# Patient Record
Sex: MALE | Race: WHITE | URBAN - METROPOLITAN AREA
[De-identification: names, ages, dates, MRNs, and addresses within clinical notes are randomized per-mention and may not be internally consistent; named-entity substitution may affect disease eponyms.]

---

## 2017-03-31 RX ORDER — ATORVASTATIN CALCIUM 10 MG/1
TABLET, FILM COATED ORAL
Qty: 90 TABLET | Refills: 0 | Status: SHIPPED | OUTPATIENT
Start: 2017-03-31 | End: 2017-06-22 | Stop reason: SDUPTHER

## 2017-06-22 ENCOUNTER — OFFICE VISIT (OUTPATIENT)
Dept: INTERNAL MEDICINE CLINIC | Age: 57
End: 2017-06-22

## 2017-06-22 VITALS
SYSTOLIC BLOOD PRESSURE: 138 MMHG | BODY MASS INDEX: 30.34 KG/M2 | WEIGHT: 224 LBS | HEART RATE: 88 BPM | DIASTOLIC BLOOD PRESSURE: 74 MMHG | HEIGHT: 72 IN

## 2017-06-22 DIAGNOSIS — M19.90 ARTHRITIS: ICD-10-CM

## 2017-06-22 DIAGNOSIS — M19.90 ARTHRITIS: Primary | ICD-10-CM

## 2017-06-22 LAB
RHEUMATOID FACTOR: <10 IU/ML
SEDIMENTATION RATE, ERYTHROCYTE: 3 MM/HR (ref 0–20)

## 2017-06-22 PROCEDURE — 99213 OFFICE O/P EST LOW 20 MIN: CPT | Performed by: INTERNAL MEDICINE

## 2017-06-22 RX ORDER — PREDNISONE 10 MG/1
TABLET ORAL
Qty: 21 TABLET | Refills: 0 | Status: SHIPPED | OUTPATIENT
Start: 2017-06-22 | End: 2017-07-02

## 2017-06-22 RX ORDER — ATORVASTATIN CALCIUM 10 MG/1
10 TABLET, FILM COATED ORAL DAILY
Qty: 90 TABLET | Refills: 3 | Status: SHIPPED | OUTPATIENT
Start: 2017-06-22 | End: 2018-08-02 | Stop reason: SDUPTHER

## 2018-02-08 ENCOUNTER — OFFICE VISIT (OUTPATIENT)
Dept: INTERNAL MEDICINE CLINIC | Age: 58
End: 2018-02-08

## 2018-02-08 VITALS
TEMPERATURE: 97.8 F | WEIGHT: 232 LBS | SYSTOLIC BLOOD PRESSURE: 132 MMHG | BODY MASS INDEX: 31.42 KG/M2 | HEIGHT: 72 IN | DIASTOLIC BLOOD PRESSURE: 80 MMHG | HEART RATE: 64 BPM

## 2018-02-08 DIAGNOSIS — R42 LIGHT HEADEDNESS: Primary | ICD-10-CM

## 2018-02-08 PROCEDURE — 99213 OFFICE O/P EST LOW 20 MIN: CPT | Performed by: INTERNAL MEDICINE

## 2018-02-08 RX ORDER — MECLIZINE HYDROCHLORIDE 25 MG/1
25 TABLET ORAL 3 TIMES DAILY PRN
Qty: 30 TABLET | Refills: 0 | Status: SHIPPED | OUTPATIENT
Start: 2018-02-08 | End: 2018-02-18

## 2018-02-08 NOTE — PROGRESS NOTES
The patient is here for acute appointment    He is having problems with \"lightheadedness\" he stated he was driving to work, felt a sense of imbalance and came here for evaluation    No diplopia, dysarthria or nasal regurgitation is reported    He has a respiratory tract infection    Physical examination  No nystagmus  Extraocular muscles intact  Neck supple  Lungs clear  Cardiac exam normal  Abdomen soft bowel sounds normal  Neurological no pronator drift deep tendon reflexes 2+ and symmetric    Impression  Vestibular problems related to upper respiratory infection    Plan  Symptomatic treatment with meclizine

## 2018-08-02 RX ORDER — ATORVASTATIN CALCIUM 10 MG/1
TABLET, FILM COATED ORAL
Qty: 30 TABLET | Refills: 2 | Status: SHIPPED | OUTPATIENT
Start: 2018-08-02 | End: 2018-11-24 | Stop reason: SDUPTHER

## 2018-11-26 RX ORDER — ATORVASTATIN CALCIUM 10 MG/1
TABLET, FILM COATED ORAL
Qty: 30 TABLET | Refills: 11 | Status: SHIPPED | OUTPATIENT
Start: 2018-11-26 | End: 2019-03-04 | Stop reason: SDUPTHER

## 2019-03-04 RX ORDER — ATORVASTATIN CALCIUM 10 MG/1
10 TABLET, FILM COATED ORAL DAILY
Qty: 90 TABLET | Refills: 3 | Status: SHIPPED | OUTPATIENT
Start: 2019-03-04 | End: 2020-04-16

## 2020-04-16 RX ORDER — ATORVASTATIN CALCIUM 10 MG/1
TABLET, FILM COATED ORAL
Qty: 90 TABLET | Refills: 3 | Status: SHIPPED | OUTPATIENT
Start: 2020-04-16

## 2025-07-31 ENCOUNTER — OFFICE VISIT (OUTPATIENT)
Age: 65
End: 2025-07-31

## 2025-07-31 VITALS
DIASTOLIC BLOOD PRESSURE: 80 MMHG | SYSTOLIC BLOOD PRESSURE: 122 MMHG | HEART RATE: 93 BPM | OXYGEN SATURATION: 97 % | TEMPERATURE: 98.6 F | BODY MASS INDEX: 28.35 KG/M2 | WEIGHT: 209 LBS

## 2025-07-31 DIAGNOSIS — J01.40 ACUTE NON-RECURRENT PANSINUSITIS: Primary | ICD-10-CM

## 2025-07-31 RX ORDER — FLUTICASONE PROPIONATE 50 MCG
2 SPRAY, SUSPENSION (ML) NASAL DAILY
Qty: 16 G | Refills: 0 | Status: SHIPPED | OUTPATIENT
Start: 2025-07-31

## 2025-07-31 RX ORDER — CETIRIZINE HYDROCHLORIDE 10 MG/1
10 TABLET ORAL DAILY
Qty: 30 TABLET | Refills: 0 | Status: SHIPPED | OUTPATIENT
Start: 2025-07-31

## 2025-07-31 RX ORDER — AZITHROMYCIN 250 MG/1
TABLET, FILM COATED ORAL
Qty: 6 TABLET | Refills: 0 | Status: SHIPPED | OUTPATIENT
Start: 2025-07-31 | End: 2025-08-10

## 2025-07-31 NOTE — PATIENT INSTRUCTIONS
prescription for Azithromycin.  Use Zyrtec and Flonase.   Drink plenty of fluids to thin mucus.  Sleep with your head propped up on a pillow.  Inhale steam three of four times daily( exp: sit in bathroom with hot shower running.)  Avoid Trigger and not smoking.  May also clean nasal passages with salt water to ease congestion. Use over-the-counter Tylenol and Motrin for pain or fever.  Follow-up with their PCP in 3-5 days if symptoms worsen or fail to improve.

## 2025-07-31 NOTE — PROGRESS NOTES
Mckay Schulz (:  1960) is a 64 y.o. male,New patient, here for evaluation of the following chief complaint(s):  Sinusitis (Patient states he feels he has a sinus infection, states he feels like someone is \"stepping on his head\" x 3-4 days.)      Assessment & Plan :  Visit Diagnoses and Associated Orders         Acute non-recurrent pansinusitis    -  Primary    azithromycin (ZITHROMAX) 250 MG tablet [69841]      fluticasone (FLONASE) 50 MCG/ACT nasal spray [45506]      cetirizine (ZYRTEC) 10 MG tablet [[id number]]                     Patient seen and evaluated for sinus pressure.  Assessment consistent with Acute Pansinusitis.  Patient is provided a prescription for Azithromycin.  Instructed to use Zyrtec and Flonase.   Drink plenty of fluids to thin mucus.  Sleep with your head propped up on a pillow.  Inhale steam three of four times daily( exp: sit in bathroom with hot shower running.)  Avoid Trigger and not smoking.  May also clean nasal passages with salt water to ease congestion. The Patient is instructed to use over-the-counter Tylenol and Motrin for pain or fever.  Instructed to follow-up with their PCP in 3 to 5 days and worsening symptoms.  The patient is agreeable with the above plan and denies questions or concerns at this time.         Subjective :    Sinusitis         64 y.o. male presents with symptoms of Sinus infection. Reports onset of symptoms several days ago. Reports over the last 3-4 days, \"It feels like someone is stepping on my head.\"  Reports, he got  the sinus congestion, drainage, and facial pressure. Denies any treatment at home as \"its not worked in the past.\"         Vitals:    25 0819 25 0830   BP: 130/85 122/80   BP Site: Right Upper Arm    Patient Position: Sitting    BP Cuff Size: Medium Adult    Pulse: 93    Temp: 98.6 °F (37 °C)    TempSrc: Oral    SpO2: 97%    Weight: 94.8 kg (209 lb)        No results found for this visit on 25.      Objective